# Patient Record
Sex: FEMALE | Race: ASIAN | NOT HISPANIC OR LATINO | URBAN - METROPOLITAN AREA
[De-identification: names, ages, dates, MRNs, and addresses within clinical notes are randomized per-mention and may not be internally consistent; named-entity substitution may affect disease eponyms.]

---

## 2022-12-21 ENCOUNTER — LAB OUTSIDE AN ENCOUNTER (OUTPATIENT)
Dept: URBAN - METROPOLITAN AREA CLINIC 126 | Facility: CLINIC | Age: 47
End: 2022-12-21

## 2022-12-21 ENCOUNTER — WEB ENCOUNTER (OUTPATIENT)
Dept: URBAN - METROPOLITAN AREA CLINIC 126 | Facility: CLINIC | Age: 47
End: 2022-12-21

## 2022-12-21 ENCOUNTER — DASHBOARD ENCOUNTERS (OUTPATIENT)
Age: 47
End: 2022-12-21

## 2022-12-21 ENCOUNTER — OFFICE VISIT (OUTPATIENT)
Dept: URBAN - METROPOLITAN AREA CLINIC 126 | Facility: CLINIC | Age: 47
End: 2022-12-21
Payer: COMMERCIAL

## 2022-12-21 VITALS
SYSTOLIC BLOOD PRESSURE: 120 MMHG | DIASTOLIC BLOOD PRESSURE: 90 MMHG | HEART RATE: 75 BPM | WEIGHT: 129 LBS | TEMPERATURE: 97 F | BODY MASS INDEX: 23.74 KG/M2 | HEIGHT: 62 IN

## 2022-12-21 DIAGNOSIS — R10.33 PERIUMBILICAL ABDOMINAL PAIN: ICD-10-CM

## 2022-12-21 PROCEDURE — 99203 OFFICE O/P NEW LOW 30 MIN: CPT | Performed by: NURSE PRACTITIONER

## 2022-12-21 NOTE — HPI-TODAY'S VISIT:
Very pleasant 47-year-old female seen today for complaints of stomach ache.  She does have a history of GERD and heartburn and takes pantoprazole.  She did have an EGD with Dr. Klein October 8, 2019 notable for medium size hiatal hernia and mildly erythematous mucosa in the gastric antrum.  Biopsies were notable for nonspecific chronic inflammation. She is seen today for c/o periumbilical abdominal pain that radiates straight through to her back. Symptoms began 2 week inNovember. felt like menstrual cramp. no symptoms in 2 weeks. but previously pain worsened after meals. would last from 2pm to 6pm then stop. she has hx of uterine fibroids and  had embolization  several years ago. had vaginal US in Jan-reportedly normal. no change in bowel habits. GERD symptoms are mostly heartburn and these have been controlled with pantoprazole. Does not take ibuprofen. no alcohol or tobacco use. has gained some weight.